# Patient Record
Sex: FEMALE | Race: BLACK OR AFRICAN AMERICAN | NOT HISPANIC OR LATINO | Employment: OTHER | URBAN - METROPOLITAN AREA
[De-identification: names, ages, dates, MRNs, and addresses within clinical notes are randomized per-mention and may not be internally consistent; named-entity substitution may affect disease eponyms.]

---

## 2017-12-10 ENCOUNTER — HOSPITAL ENCOUNTER (EMERGENCY)
Facility: HOSPITAL | Age: 37
Discharge: HOME | End: 2017-12-11
Attending: EMERGENCY MEDICINE
Payer: MEDICARE

## 2017-12-10 DIAGNOSIS — J02.9 PHARYNGITIS, UNSPECIFIED ETIOLOGY: Primary | ICD-10-CM

## 2017-12-10 LAB
ALBUMIN SERPL BCP-MCNC: 3.9 G/DL
ALP SERPL-CCNC: 110 U/L
ALT SERPL W/O P-5'-P-CCNC: 15 U/L
ANION GAP SERPL CALC-SCNC: 13 MMOL/L
AST SERPL-CCNC: 18 U/L
B-HCG UR QL: NEGATIVE
BASOPHILS # BLD AUTO: 0.05 K/UL
BASOPHILS NFR BLD: 0.4 %
BILIRUB SERPL-MCNC: 0.7 MG/DL
BUN SERPL-MCNC: 6 MG/DL
CALCIUM SERPL-MCNC: 9.5 MG/DL
CHLORIDE SERPL-SCNC: 104 MMOL/L
CO2 SERPL-SCNC: 22 MMOL/L
CREAT SERPL-MCNC: 0.9 MG/DL
CTP QC/QA: YES
DEPRECATED S PYO AG THROAT QL EIA: NEGATIVE
DIFFERENTIAL METHOD: ABNORMAL
EOSINOPHIL # BLD AUTO: 0 K/UL
EOSINOPHIL NFR BLD: 0.2 %
ERYTHROCYTE [DISTWIDTH] IN BLOOD BY AUTOMATED COUNT: 12.9 %
EST. GFR  (AFRICAN AMERICAN): >60 ML/MIN/1.73 M^2
EST. GFR  (NON AFRICAN AMERICAN): >60 ML/MIN/1.73 M^2
FLUAV AG SPEC QL IA: NEGATIVE
FLUBV AG SPEC QL IA: NEGATIVE
GLUCOSE SERPL-MCNC: 109 MG/DL
HCT VFR BLD AUTO: 40 %
HGB BLD-MCNC: 13 G/DL
IMM GRANULOCYTES # BLD AUTO: 0.06 K/UL
IMM GRANULOCYTES NFR BLD AUTO: 0.4 %
LYMPHOCYTES # BLD AUTO: 1.9 K/UL
LYMPHOCYTES NFR BLD: 13.5 %
MCH RBC QN AUTO: 27.4 PG
MCHC RBC AUTO-ENTMCNC: 32.5 G/DL
MCV RBC AUTO: 84 FL
MONOCYTES # BLD AUTO: 1.1 K/UL
MONOCYTES NFR BLD: 7.7 %
NEUTROPHILS # BLD AUTO: 10.9 K/UL
NEUTROPHILS NFR BLD: 77.8 %
NRBC BLD-RTO: 0 /100 WBC
PLATELET # BLD AUTO: 234 K/UL
PLATELET BLD QL SMEAR: ABNORMAL
PMV BLD AUTO: ABNORMAL FL
POTASSIUM SERPL-SCNC: 4 MMOL/L
PROT SERPL-MCNC: 8.7 G/DL
RBC # BLD AUTO: 4.75 M/UL
SODIUM SERPL-SCNC: 139 MMOL/L
SPECIMEN SOURCE: NORMAL
WBC # BLD AUTO: 14.06 K/UL

## 2017-12-10 PROCEDURE — 99284 EMERGENCY DEPT VISIT MOD MDM: CPT | Mod: 25

## 2017-12-10 PROCEDURE — 99284 EMERGENCY DEPT VISIT MOD MDM: CPT | Mod: ,,, | Performed by: EMERGENCY MEDICINE

## 2017-12-10 PROCEDURE — 80053 COMPREHEN METABOLIC PANEL: CPT

## 2017-12-10 PROCEDURE — 25500020 PHARM REV CODE 255: Performed by: EMERGENCY MEDICINE

## 2017-12-10 PROCEDURE — 81025 URINE PREGNANCY TEST: CPT | Performed by: EMERGENCY MEDICINE

## 2017-12-10 PROCEDURE — 87081 CULTURE SCREEN ONLY: CPT

## 2017-12-10 PROCEDURE — 25000003 PHARM REV CODE 250: Performed by: EMERGENCY MEDICINE

## 2017-12-10 PROCEDURE — 96374 THER/PROPH/DIAG INJ IV PUSH: CPT

## 2017-12-10 PROCEDURE — 63600175 PHARM REV CODE 636 W HCPCS: Performed by: EMERGENCY MEDICINE

## 2017-12-10 PROCEDURE — 96375 TX/PRO/DX INJ NEW DRUG ADDON: CPT

## 2017-12-10 PROCEDURE — 87880 STREP A ASSAY W/OPTIC: CPT

## 2017-12-10 PROCEDURE — 87400 INFLUENZA A/B EACH AG IA: CPT | Mod: 59

## 2017-12-10 PROCEDURE — 96361 HYDRATE IV INFUSION ADD-ON: CPT

## 2017-12-10 PROCEDURE — 85025 COMPLETE CBC W/AUTO DIFF WBC: CPT

## 2017-12-10 RX ORDER — KETOROLAC TROMETHAMINE 30 MG/ML
10 INJECTION, SOLUTION INTRAMUSCULAR; INTRAVENOUS
Status: COMPLETED | OUTPATIENT
Start: 2017-12-10 | End: 2017-12-10

## 2017-12-10 RX ORDER — DEXAMETHASONE SODIUM PHOSPHATE 4 MG/ML
8 INJECTION, SOLUTION INTRA-ARTICULAR; INTRALESIONAL; INTRAMUSCULAR; INTRAVENOUS; SOFT TISSUE
Status: COMPLETED | OUTPATIENT
Start: 2017-12-10 | End: 2017-12-10

## 2017-12-10 RX ADMIN — KETOROLAC TROMETHAMINE 10 MG: 30 INJECTION, SOLUTION INTRAMUSCULAR at 10:12

## 2017-12-10 RX ADMIN — SODIUM CHLORIDE 1000 ML: 0.9 INJECTION, SOLUTION INTRAVENOUS at 10:12

## 2017-12-10 RX ADMIN — IOHEXOL 75 ML: 350 INJECTION, SOLUTION INTRAVENOUS at 10:12

## 2017-12-10 RX ADMIN — DEXAMETHASONE SODIUM PHOSPHATE 8 MG: 4 INJECTION, SOLUTION INTRAMUSCULAR; INTRAVENOUS at 10:12

## 2017-12-11 VITALS
OXYGEN SATURATION: 98 % | BODY MASS INDEX: 24.11 KG/M2 | HEART RATE: 100 BPM | DIASTOLIC BLOOD PRESSURE: 80 MMHG | RESPIRATION RATE: 18 BRPM | TEMPERATURE: 99 F | WEIGHT: 150 LBS | HEIGHT: 66 IN | SYSTOLIC BLOOD PRESSURE: 149 MMHG

## 2017-12-11 PROCEDURE — 25000003 PHARM REV CODE 250: Performed by: EMERGENCY MEDICINE

## 2017-12-11 RX ORDER — CLINDAMYCIN HYDROCHLORIDE 150 MG/1
300 CAPSULE ORAL 4 TIMES DAILY
Qty: 56 CAPSULE | Refills: 0 | Status: SHIPPED | OUTPATIENT
Start: 2017-12-11 | End: 2017-12-18

## 2017-12-11 RX ORDER — CLINDAMYCIN HYDROCHLORIDE 150 MG/1
300 CAPSULE ORAL
Status: COMPLETED | OUTPATIENT
Start: 2017-12-11 | End: 2017-12-11

## 2017-12-11 RX ADMIN — CLINDAMYCIN HYDROCHLORIDE 300 MG: 150 CAPSULE ORAL at 12:12

## 2017-12-11 NOTE — ED NOTES
"Chief Complaint   Patient presents with    Multiple Complaints     sore throat, both ears are popping, vomiting, headaches.     "My throat is hurting and my ears is poppin. I feel like I am getting strep throat. It hurt when I cough, it hurt when I swallow." Reports that SXs began yesterday, and that the pain "comes and it goes." +NV, with last episode of emesis being "right before I came here." Denies fevers, but reports chills. +Breast cancer HX [R sided with mets to lymphnodes]. Reports pain 10/10.  "

## 2017-12-11 NOTE — ED PROVIDER NOTES
Encounter Date: 12/10/2017    SCRIBE #1 NOTE: I, Kristy Stoddard, am scribing for, and in the presence of, Dr. Pina.       History     Chief Complaint   Patient presents with    Multiple Complaints     sore throat, both ears are popping, vomiting, headaches.       12/10/2017 9:12 PM     Chief complaint: Sore Throat      Yemi Griffin is a 37 y.o. female with PMHx of breast cancer and thyroid dz who presents to the ED with an onset of worsening sore throat x 24 hours with associated sx of bilateral ear pain, HA, near syncope, difficulty swallowing, back pain, and palpitations. Pt reports palpitations upon standing. Pt was seen at [facility] for strep throat 2 weeks before Thanksgiving and was given a shot of penicillin; she states she is diagnosed with strep throat yearly and her daughter currently has strep. Pt reports the pain is exacerbated with belching. There are no alleviating factors for symptoms. Pt states she received her flu shot this year. Denies fever, rhinorrhea, congestion, nausea, vomiting, abd pain, and appetite change. LNMP is due tomorrow. Allergic to Vicodin.           The history is provided by the patient.     Review of patient's allergies indicates:   Allergen Reactions    Vicodin [hydrocodone-acetaminophen]      Past Medical History:   Diagnosis Date    Cancer 08/2015-06/2016    In remission for R sided breast cancer    Thyroid disease     Hyperthyroidism during pregnancy (reported)     Past Surgical History:   Procedure Laterality Date    LYMPHADENECTOMY Right 2015     History reviewed. No pertinent family history.  Social History   Substance Use Topics    Smoking status: Never Smoker    Smokeless tobacco: Never Used    Alcohol use No     Review of Systems   Constitutional: Negative for appetite change and fever.   HENT: Positive for ear pain (Bilateral), sore throat and trouble swallowing. Negative for congestion.    Eyes: Negative for redness.   Respiratory: Negative for cough.     Cardiovascular: Positive for palpitations. Negative for chest pain.   Gastrointestinal: Negative for abdominal pain, nausea and vomiting.   Genitourinary: Negative for dysuria.   Musculoskeletal: Positive for back pain.   Skin: Negative for rash.   Neurological: Positive for headaches. Negative for syncope (Near-syncope).   Psychiatric/Behavioral: Negative for confusion.       Physical Exam     Initial Vitals [12/10/17 1731]   BP Pulse Resp Temp SpO2   (!) 152/87 110 18 98.9 °F (37.2 °C) 100 %      MAP       108.67         Physical Exam    Nursing note and vitals reviewed.  Constitutional: She appears well-developed and well-nourished. No distress.   Generally appears uncomfortable. Hot potato voice.   HENT:   Head: Normocephalic and atraumatic.   Right Ear: External ear normal.   Left Ear: External ear normal.   Nose: No rhinorrhea.   Mouth/Throat: Oropharynx is clear and moist. No trismus in the jaw. No oropharyngeal exudate, posterior oropharyngeal edema or posterior oropharyngeal erythema.   Right ear canal occluded by cerumen. Dull light reflexes. Bilateral TM no discharge. Mild tonsillar enlargement. Floor of mouth is soft. No stridor.   Eyes: EOM are normal. Pupils are equal, round, and reactive to light.   Neck: Normal range of motion. Neck supple.   Cardiovascular: Regular rhythm and normal heart sounds. Tachycardia present.  Exam reveals no gallop and no friction rub.    No murmur heard.  Pulmonary/Chest: Breath sounds normal. No respiratory distress. She has no wheezes. She has no rhonchi. She has no rales.   Abdominal: Soft. She exhibits no distension. There is no tenderness.   Musculoskeletal: Normal range of motion.   Lymphadenopathy:     She has no cervical adenopathy.   Neurological: She is alert and oriented to person, place, and time. She has normal strength. No cranial nerve deficit or sensory deficit.   Skin: Skin is warm and dry.   Psychiatric: Her behavior is normal. Thought content normal.          ED Course   Procedures  Labs Reviewed - No data to display          Medical Decision Making:   History:   Old Medical Records: I decided to obtain old medical records.  Old Records Summarized: other records.       <> Summary of Records: No previous visits noted other than prescription note in 2015.  Initial Assessment:   Emergent evaluation of 38 y/o women with hx of strep throat and recently seen for same, now with painful swallowing. Given lack of findings of physical exam, I am concerned for possibility of retropharyngeal abscess and epiglottitis.   Differential Diagnosis:   Differential diagnosis includes, but is not limited to retropharyngeal abscess and epiglottitis.   Clinical Tests:   Lab Tests: Ordered and Reviewed  Radiological Study: Ordered and Reviewed  ED Management:  Will give Iv, give fluids, give Toradol for pain. Check Labs and CT soft tissue of the neck. Will check rapid strep and flu as well as give a dose of Decadron.             Scribe Attestation:   Scribe #1: I performed the above scribed service and the documentation accurately describes the services I performed. I attest to the accuracy of the note.    Attending Attestation:           Physician Attestation for Scribe:      Comments: I, Dr. Negrita Lobo, personally performed the services described in this documentation. All medical record entries made by the scribe were at my direction and in my presence.  I have reviewed the chart and agree that the record reflects my personal performance and is accurate and complete. Negrita Lobo MD.    Attending ED Notes:   12:28 AM  CT scan of neck was unremarkable. Pt reports she is feeling better. Given that pt has occurring episodes of strep and states that symptoms are similar, I will treat her for strep pharyngitis with the assumption that rapid strep may show false negative. Given that she was recently prescribed IM penicillin, I will give her PO Clindamycin 1st dose tonight.  Advise to push fluids and follow-up with PCP in 1 week.          ED Course      Clinical Impression:   The encounter diagnosis was Pharyngitis, unspecified etiology.                           Negrita Lobo MD  12/13/17 2728

## 2017-12-11 NOTE — ED NOTES
Patient ambulated to ED intake 2.    Appearance: Patient resting in intake chair, and is in no acute distress at this time. Patient is clean and well groomed, with clothing properly fastened.   Cardiac: Heart sounds audible and without abnormalities noted. Patient has a normal rate.  Neuro/LOC: Eyes open spontaneously, behavior appropriate to situation, follows commands, facial expression symmetrical, purposeful motor response noted. AAOx4; The patient is awake, alert and aware of environment with an appropriate affect, and speaking appropriately. Denies dizziness and HA.  Respiratory: Breath sounds audible, equal and clear bilaterally. Airway is open and patent, respirations are spontaneous, patient has a normal effort and rate, no accessory muscle use noted.  Musculoskeletal: Patient moving all extremities spontaneously, no obvious swelling or deformities noted.  HEENT: Unable to visualize back of throat due to patient refusing to fully open mouth due to pain. Mucous membranes pink in color and moist.    -Patient identifiers verified and correct for this patient.  -Limb alert and allergy band in place to right arm.

## 2017-12-13 LAB — BACTERIA THROAT CULT: NORMAL
